# Patient Record
Sex: MALE | Race: BLACK OR AFRICAN AMERICAN | NOT HISPANIC OR LATINO | Employment: FULL TIME | URBAN - METROPOLITAN AREA
[De-identification: names, ages, dates, MRNs, and addresses within clinical notes are randomized per-mention and may not be internally consistent; named-entity substitution may affect disease eponyms.]

---

## 2022-06-22 ENCOUNTER — TRANSCRIBE ORDERS (OUTPATIENT)
Dept: PHYSICAL THERAPY | Facility: CLINIC | Age: 27
End: 2022-06-22

## 2022-06-22 DIAGNOSIS — G89.11 ACUTE PAIN OF LEFT SHOULDER DUE TO TRAUMA: ICD-10-CM

## 2022-06-22 DIAGNOSIS — M25.512 ACUTE PAIN OF LEFT SHOULDER DUE TO TRAUMA: ICD-10-CM

## 2022-06-22 DIAGNOSIS — S46.002A INJURY OF LEFT ROTATOR CUFF, INITIAL ENCOUNTER: Primary | ICD-10-CM

## 2022-06-24 ENCOUNTER — TREATMENT (OUTPATIENT)
Dept: PHYSICAL THERAPY | Facility: CLINIC | Age: 27
End: 2022-06-24

## 2022-06-24 DIAGNOSIS — M25.512 ACUTE PAIN OF LEFT SHOULDER: Primary | ICD-10-CM

## 2022-06-24 PROCEDURE — 97110 THERAPEUTIC EXERCISES: CPT | Performed by: PHYSICAL THERAPIST

## 2022-06-24 PROCEDURE — 97161 PT EVAL LOW COMPLEX 20 MIN: CPT | Performed by: PHYSICAL THERAPIST

## 2022-06-24 PROCEDURE — 97014 ELECTRIC STIMULATION THERAPY: CPT | Performed by: PHYSICAL THERAPIST

## 2022-06-24 PROCEDURE — 97140 MANUAL THERAPY 1/> REGIONS: CPT | Performed by: PHYSICAL THERAPIST

## 2022-06-24 NOTE — PATIENT INSTRUCTIONS
Access Code: [email or web address]  URL: [email or web address]  Date: 06/24/2022  Prepared by: Leila Miller    Exercises  Seated Shoulder Flexion Towel Slide at Table Top - 2-3 x daily - 15-30 reps  Standing 'L' Stretch at Counter - 2-3 x daily - 15-30 reps  Seated Shoulder Shrugs - 2-3 x daily - 15-30 reps  Seated Scapular Retraction - 2-3 x daily - 15-30 reps  Supine Shoulder External Rotation with Dowel - 2-3 x daily - 15-30 reps  Seated Shoulder External Rotation AAROM with Cane and Hand in Neutral - 2-3 x daily - 15-30 reps  Horizontal Shoulder Pendulum with Table Support - 2-3 x daily - 15-30 reps

## 2022-06-24 NOTE — PROGRESS NOTES
Physical Therapy Initial Evaluation and Plan of Care    Patient: Saul Aguirre   : 1985  Diagnosis/ICD-10 Code:  Acute pain of left shoulder [M25.512]  Referring practitioner: Gunnar Le Jr.*  Date of Initial Visit: 2022  Today's Date: 2022  Patient seen for 1 session         Visit Diagnoses:    ICD-10-CM ICD-9-CM   1. Acute pain of left shoulder  M25.512 719.41         Subjective Questionnaire: QuickDASH: 56.82% impairment      Subjective Evaluation    History of Present Illness  Date of onset: 2022  Mechanism of injury: Fell from a  about 8 feet and landed directly on his L shoulder with his arm against his side. Had XR (-) for fx. Waiting for MRI to be approved/scheduled. If arm relaxed pn is minimal. Has pn w/ most L shoulder mobility. Cannot lift past shoulder height. Pn increases the longer he is out of the sling. Pn wakes him a couple of times at night. Pn located top of shoulder, radiates into the back of the shoulder and down his bicep/tricep to his elbow. Denies pn beyond the elbow, N/T. Having inc popping/clicking w/ movement.     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   Precautions and Work Restrictions: off workQuality of life: fair    Pain  Current pain ratin  At best pain ratin  At worst pain rating: 10  Quality: radiating and sharp  Relieving factors: ice, rest, heat and medications  Aggravating factors: lifting, movement, overhead activity, sleeping, outstretched reach and repetitive movement  Progression: no change    Hand dominance: right    Diagnostic Tests  X-ray: normal    Treatments  Previous treatment: immobilization  Patient Goals  Patient goals for therapy: decreased pain, increased motion, increased strength, independence with ADLs/IADLs, return to sport/leisure activities and return to work               Objective          Observations   Left Shoulder   Negative for atrophy and deformity.      Additional Shoulder Observation Details  Presents w/ L arm in sling    Tenderness     Left Shoulder   Tenderness in the AC joint, acromion, biceps tendon (proximal), infraspinatus tendon, lateral scapula and supraspinatus tendon.     Cervical/Thoracic Screen   Cervical range of motion within normal limits    Neurological Testing     Sensation     Shoulder   Left Shoulder   Intact: light touch    Right Shoulder   Intact: light touch    Active Range of Motion   Left Shoulder   Flexion: 70 (pn superior and anterior shoulder) degrees   Abduction: 60 (pn superior and anterior shoulder) degrees   External rotation 0°: 68 degrees   Internal rotation BTB: Active internal rotation behind the back: R gluteal region.     Right Shoulder   Flexion: 155 degrees   Abduction: 165 degrees   External rotation 0°: 85 degrees   Internal rotation BTB: L1     Additional Active Range of Motion Details  Elbow/wrist mobility WNL      Passive Range of Motion   Left Shoulder   Flexion: 80 degrees   Abduction: 70 degrees   External rotation 45°: 40 degrees   External rotation 90°: 75 degrees   Internal rotation 90°: 45 degrees     Additional Passive Range of Motion Details  pn ant shoulder w/ all PROM    Strength/Myotome Testing     Left Shoulder     Planes of Motion   Flexion: 2-   Abduction: 2-   External rotation at 0°: 3-   Internal rotation at 0°: 3     Left Elbow   Flexion: 4+  Extension: 4+  Forearm supination: 4+  Forearm pronation: 5    Tests   Cervical     Left   Positive active compression ([place]).     Left Shoulder   Positive full can, Hawkin's, Neer's and Speed's.   Negative belly press, drop arm, external rotation lag sign, internal rotation lag sign and relocation.       See Exercise, Manual, and Modality Logs for complete treatment.       Assessment & Plan     Assessment  Impairments: abnormal or restricted ROM, activity intolerance, impaired physical strength, lacks appropriate home exercise program and pain with  function  Functional Limitations: carrying objects, lifting, sleeping, pulling, pushing, uncomfortable because of pain, reaching behind back, reaching overhead and unable to perform repetitive tasks  Assessment details: Pt is a 37 YOM who presents to PT w/ complaint of acute L shoulder pn after falling approx 8 ft from a piece of equipment on 6/20/22. Findings consistent w/ L RC/labral injury. He exhibits pronounced deficits in L shoulder A/PROM and strength in all planes. Very TTP along the posterior cuff, AC joint, ant GH joint, and proximal bicep tendon. Drop arm/lag signs (-), resisted elevation/rotation weak and painful. Speeds, labral tests reproduce ant GH pn and popping. (-) cervical involvement. Pt's pn and deficits limit his ability to perform daily/work activities. He would benefit from skilled PT services to address deficits and allow return to PLOF.    Barriers to therapy: n/a  Prognosis: good    Goals  Plan Goals: STG 3 wks  1) Pt to be compliant w/ initial HEP for ROM, strength and symptom mgmt.  2) Pt to report pn w/ ADL's to be no greater than 4/10.  3) Pt to improve shoulder AROM to 120 deg elevation, 75 deg ER  or better.  4) Pt to improve behind back IR ROM to L5 or higher.  5) Pt to improve QD score to 35% impairment or better to reflect improved pn and function.    LTG 6 wks  1) Pt to be independent w/ long term HEP and self mgmt.  2) Pt to tolerate 40 mins or more of work simulated activities w/ little to no pn or dysfunction.  3) Pt to demo restored shoulder mobility relative to contralateral side.  4) Pt to improve QD score to 15% impairment or better to reflect improved pn and function.   5) Pt to improve L shoulder strength to 4+/5 or greater in all planes.      Plan  Therapy options: will be seen for skilled therapy services  Planned modality interventions: TENS, cryotherapy, thermotherapy (hydrocollator packs), ultrasound and dry needling  Planned therapy interventions: flexibility,  functional ROM exercises, home exercise program, joint mobilization, manual therapy, neuromuscular re-education, postural training, soft tissue mobilization, strengthening, stretching, therapeutic activities, ADL retraining and body mechanics training  Frequency: 2x week  Duration in weeks: 12  Treatment plan discussed with: patient  Plan details: PT POC to emphasize restoration of pn free shoulder mobility, scapular and shoulder strength, dynamic stability, and appropriate body mechanics w/ work simulated activities utilizing TE/TA/NMR/MT, modalities as indicated for pn control        History # of Personal Factors and/or Comorbidities: LOW (0)  Examination of Body System(s): # of elements: LOW (1-2)  Clinical Presentation: EVOLVING  Clinical Decision Making: LOW       Timed:         Manual Therapy:    8     mins  94588;     Therapeutic Exercise:    15     mins  42300;     Neuromuscular Drew:    0    mins  51056;    Therapeutic Activity:     0     mins  11700;     Gait Trainin     mins  65828;     Ultrasound:     0     mins  43973;    Ionto                               0    mins   12539  Self Care                       0     mins   88757  Canalith Repos    0     mins 25691      Un-Timed:  Electrical Stimulation:    15     mins  27897 ( );  Dry Needling     0     mins self-pay  Traction     0     mins 62599  Low Eval     25     Mins  05219  Mod Eval     0     Mins  17422  High Eval                       0     Mins  30992        Timed Treatment:   23   mins   Total Treatment:     63   mins          PT: Leila Miller PT     [place] License: #[id number]    Electronically signed by Leila Miller PT, 22, 8:39 AM EDT    Certification Period: 2022 thru 2022  I certify that the therapy services are furnished while this patient is under my care.  The services outlined above are required by this patient, and will be reviewed every 90 days.         Physician  Signature:__________________________________________________    PHYSICIAN: Gunnar Le Jr., ANAHI      DATE:     Please sign and return via fax to [phone]. Thank you, [facility].

## 2022-06-27 ENCOUNTER — TREATMENT (OUTPATIENT)
Dept: PHYSICAL THERAPY | Facility: CLINIC | Age: 27
End: 2022-06-27

## 2022-06-27 NOTE — PROGRESS NOTES
Physical Therapy Daily Treatment Note      Patient: Saul Aguirre   : 1985  Referring practitioner: Gunnar Le Jr.*  Date of Initial Visit: Type: THERAPY  Noted: 2022  Today's Date: 2022  Patient seen for 2 sessions       Visit Diagnoses:  No diagnosis found.    Subjective   Saul Aguirre reports: he doesn't have a lot of pain at rest in the sling but pain comes on the longer he is out of the sling and with movement.    Pre tx pn score: 6  Post tx pn score: 4 after IFC and CP      Objective   See Exercise, Manual, and Modality Logs for complete treatment.       Assessment & Plan     Assessment    Assessment details: Pt with increased c/o pain with exercises and some discomfort with gentle manual therapy but able to tolerate.   Prognosis details: Progress GH and scapular ROM as tolerated to start getting more movement.          Timed:         Manual Therapy:    8     mins  12681;     Therapeutic Exercise:    25     mins  94123;     Neuromuscular Drew:        mins  55392;    Therapeutic Activity:          mins  35273;     Gait Training:           mins  78407;     Ultrasound:          mins  76398;    Ionto                                   mins   84727  Self Care                            mins   82202  Canalith Repos         mins 79829      Un-Timed:  Electrical Stimulation:    15     mins  36367 ( );  Dry Needling          mins self-pay  Traction          mins 46854      Timed Treatment:   48   mins   Total Treatment:     55   mins    DENEEN Araiza License: #[id number]

## 2022-06-29 ENCOUNTER — OFFICE VISIT (OUTPATIENT)
Dept: ORTHOPEDIC SURGERY | Facility: CLINIC | Age: 27
End: 2022-06-29

## 2022-06-29 VITALS
WEIGHT: 290 LBS | HEIGHT: 70 IN | BODY MASS INDEX: 41.52 KG/M2 | SYSTOLIC BLOOD PRESSURE: 125 MMHG | DIASTOLIC BLOOD PRESSURE: 85 MMHG

## 2022-06-29 DIAGNOSIS — Z02.6 ENCOUNTER RELATED TO WORKER'S COMPENSATION CLAIM: ICD-10-CM

## 2022-06-29 DIAGNOSIS — M25.512 LEFT SHOULDER PAIN, UNSPECIFIED CHRONICITY: Primary | ICD-10-CM

## 2022-06-29 PROCEDURE — 99204 OFFICE O/P NEW MOD 45 MIN: CPT | Performed by: ORTHOPAEDIC SURGERY

## 2022-06-29 NOTE — PROGRESS NOTES
"      [facility] Note    Subjective     CC: Pain of the Left Shoulder      HPI  Saul Aguirer is a 27 y.o. male who presents with new problem of: left shoulder pain.  Onset: mechanical fall. The issue has been ongoing for 9 day(s). Pain is a 7/10 on the pain scale. Pain is described as aching and burning. Associated symptoms include pain, popping, grinding and stiffness. The pain is worse with sleeping, working, lying on affected side and any movement of the joint; resting and ice improve the pain. Previous treatments have included: NSAIDS and physical therapy.    I have reviewed the following portions of the patient's history:History of Present Illness and review of systems.    He injured his left shoulder at work on June 20.  He felt a pop in his left shoulder.  Pain is 7 out of 10.  He works through construction.  He was referred to me for a rotator cuff tear.    Review of Systems   Constitutional: Negative.  Negative for chills, fatigue and fever.   HENT: Negative.  Negative for congestion and dental problem.    Eyes: Negative.  Negative for blurred vision.   Respiratory: Negative.  Negative for shortness of breath.    Cardiovascular: Negative.  Negative for leg swelling.   Gastrointestinal: Negative.  Negative for abdominal pain.   Endocrine: Negative.  Negative for polyuria.   Genitourinary: Negative.  Negative for difficulty urinating.   Musculoskeletal: Positive for arthralgias.   Skin: Negative.    Allergic/Immunologic: Negative.    Neurological: Negative.    Hematological: Negative.  Negative for adenopathy.   Psychiatric/Behavioral: Negative.  Negative for behavioral problems.       ROS:    Constiutional:Pt denies fever, chills, nausea, or vomiting.  MSK:as above      Objective      Past Medical History  Past Medical History:   Diagnosis Date   • No pertinent past medical history          Physical Exam  /85   Ht 177.8 cm (70\")   Wt 132 kg (290 lb)   BMI 41.61 kg/m²     Body " mass index is 41.61 kg/m².    Patient is well nourished and well developed.        Ortho Exam  Left shoulder with active flexion abduction to 90 degrees.  Passively 120 on the left.  Rotator cuff strength 4 out of 5.  Positive drop arm test.  Equivocal Wallace's test.  No specific tenderness.    Imaging/Labs/EMG Reviewed:  Imaging Results (Last 24 Hours)     Procedure Component Value Units Date/Time    XR Shoulder 2+ View Left [[id number]] Resulted: 06/29/22 1348     Updated: 06/29/22 1349    Narrative:      Left Shoulder X-Ray  Indication: Pain  AP, scapular Y, and axillary lateral views    Findings:  No fracture  No bony lesion  Normal soft tissues  Normal joint spaces    No prior studies were available for comparison.            Assessment:  1. Left shoulder pain, unspecified chronicity    2. Encounter related to worker's compensation claim        Plan:  Recommend over the counter anti-inflammatories for pain and/or swelling  I have ordered an MRI to evaluate for rotator cuff tear left shoulder.  I have ordered physical therapy to be done at [facility].  Continue work restrictions no use left arm    Follow Up:   Return for After MRI.      Medical Decision Making  Management Options : Low - OTC Drugs and OT or PT Therapy  and Moderate - 1 Undiagnosed New Problem with Uncertain Prognosis        Catarino Palomino M.D., [facility]  Orthopedic Surgeon  Fellowship Trained Sports Medicine  [facility]  Orthopedics and Sports Medicine  [address], [address]  [place] [address]    EMR Dragon/Transcription disclaimer:  Much of this encounter note is an electronic transcription of spoken language to printed text. Electronic transcription of spoken language may permit erroneous, or at times, nonsensical words or phrases to be inadvertently transcribed. Although I have reviewed the note for such errors, some may still exist.

## 2022-07-01 ENCOUNTER — TREATMENT (OUTPATIENT)
Dept: PHYSICAL THERAPY | Facility: CLINIC | Age: 27
End: 2022-07-01
Payer: OTHER MISCELLANEOUS

## 2022-07-01 DIAGNOSIS — M25.512 ACUTE PAIN OF LEFT SHOULDER: Primary | ICD-10-CM

## 2022-07-07 ENCOUNTER — TREATMENT (OUTPATIENT)
Dept: PHYSICAL THERAPY | Facility: CLINIC | Age: 27
End: 2022-07-07

## 2022-07-07 DIAGNOSIS — M25.512 ACUTE PAIN OF LEFT SHOULDER: Primary | ICD-10-CM

## 2022-07-07 PROCEDURE — 97110 THERAPEUTIC EXERCISES: CPT | Performed by: PHYSICAL THERAPIST

## 2022-07-07 PROCEDURE — 97014 ELECTRIC STIMULATION THERAPY: CPT | Performed by: PHYSICAL THERAPIST

## 2022-07-07 PROCEDURE — 97140 MANUAL THERAPY 1/> REGIONS: CPT | Performed by: PHYSICAL THERAPIST

## 2022-07-07 NOTE — PROGRESS NOTES
Physical Therapy Daily Treatment Note      Patient: Saul Aguirre   : 1995  Referring practitioner: Catarino Palomino MD  Date of Initial Visit: Type: THERAPY  Noted: 2022  Today's Date: 2022  Patient seen for 4 sessions       Visit Diagnoses:    ICD-10-CM ICD-9-CM   1. Acute pain of left shoulder  M25.512 719.41       Subjective   Saul Aguirre reports: Seems to be a little better. Pn not waking him as often at night. Saw Ortho last week, referred for MRI which he had performed yesterday. Was told to take disc to [facility]. Left it at home today. Still having pn w/ generally any movement of the L arm, but more so w/ reaching forward or overhead.    Pre tx pn score: 7  Post tx pn score: 0      Objective   See Exercise, Manual, and Modality Logs for complete treatment.     Active Range of Motion   Left Shoulder   Flexion: 115 (pn superior and anterior shoulder) degrees   Abduction: 90 (pn superior and anterior shoulder) degrees   External rotation 0°: 85 degrees   Internal rotation BTB: deferred      Assessment & Plan     Assessment    Assessment details: Active shoulder mobility improved in all planes (IR deferred 2/2 significant pn). Cont to complain of inc pn/popping w/ movement into any plane. Tolerance to PROM remains limited, notes slight relief w/ GHJ distraction. Pn unchanged after exercise, but diminished completely after ice/estim. No changes made to HEP. MRI completed but has not yet discussed results w/ MD. Pt needs continued PT to restore full strength, ROM, and function in order to allow return to full work and daily activities w/o pn or dysfunction.      Plan  Plan details: Cont w/ AAROM/PROM, light scapular strengthening as tolerated          Timed:         Manual Therapy:    8     mins  15123;     Therapeutic Exercise:    35     mins  81412;     Neuromuscular Drew:    0    mins  19771;    Therapeutic Activity:     0     mins  35753;     Gait Trainin     mins  02352;      Ultrasound:     0     mins  20655;    Ionto                               0    mins   59567  Self Care                       0     mins   13056  Canalith Repos    0     mins 40312      Un-Timed:  Electrical Stimulation:    15     mins  56859 ( );  Dry Needling     0     mins self-pay  Traction     0     mins 81681      Timed Treatment:   43   mins   Total Treatment:     58   mins    Leila Miller, PT  [place] License: #[id number]

## 2022-07-11 ENCOUNTER — TREATMENT (OUTPATIENT)
Dept: PHYSICAL THERAPY | Facility: CLINIC | Age: 27
End: 2022-07-11

## 2022-07-11 DIAGNOSIS — M25.512 ACUTE PAIN OF LEFT SHOULDER: Primary | ICD-10-CM

## 2022-07-11 DIAGNOSIS — M25.512 LEFT SHOULDER PAIN, UNSPECIFIED CHRONICITY: ICD-10-CM

## 2022-07-11 PROCEDURE — 97110 THERAPEUTIC EXERCISES: CPT | Performed by: PHYSICAL THERAPIST

## 2022-07-11 PROCEDURE — 97014 ELECTRIC STIMULATION THERAPY: CPT | Performed by: PHYSICAL THERAPIST

## 2022-07-11 PROCEDURE — 97140 MANUAL THERAPY 1/> REGIONS: CPT | Performed by: PHYSICAL THERAPIST

## 2022-07-11 NOTE — PROGRESS NOTES
Physical Therapy Daily Treatment Note      Patient: Saul Aguirre   : 1995  Referring practitioner: Catarino Palomino MD  Date of Initial Visit: Type: THERAPY  Noted: 2022  Today's Date: 2022  Patient seen for 5 sessions       Visit Diagnoses:    ICD-10-CM ICD-9-CM   1. Acute pain of left shoulder  M25.512 719.41       Subjective   Saul Aguirre reports: Shoulder seems to be feeling a little better. Still having some ant shoulder pn that radiates to the back of the arm w/ movement.    Pre tx pn score: 4  Post tx pn score: 0      Objective   See Exercise, Manual, and Modality Logs for complete treatment.     Active Range of Motion   Left Shoulder   Flexion: 125 deg w/ ant shoulder pn  Abduction: 90 deg w/ ant shoulder pn  External rotation 0°: 85 degrees   Internal rotation BTB: L1      Assessment & Plan     Assessment    Assessment details: Shoulder mobility continues to gradually improve. Pn decreased overall per pt report. Exercise tolerance remains somewhat low. Pt c/o localized ant shoulder pn w/ nearly all exercise, w/ occasional reports of pn radiating into back of upper arm. Unable to perform supine AAROM activities w/o PT assistance. PROM remains limited to 90 deg elevation 2/2 pn. Incorporated low grade GH joint mobilizations in attempt to reduce pn but only tolerated for short duration. No changes made to HEP. Pt needs continued PT to restore full strength, ROM, and function in order to allow return to full work and daily activities w/o pn or dysfunction.      Plan  Plan details: Cont w/ AAROM/AROM in gravity reduced positions as tolerated          Timed:         Manual Therapy:    10     mins  69998;     Therapeutic Exercise:    25     mins  18925;     Neuromuscular Drew:    0    mins  92614;    Therapeutic Activity:     0     mins  34912;     Gait Trainin     mins  23895;     Ultrasound:     0     mins  80740;    Ionto                               0    mins   45124  Self  Care                       0     mins   68663  Canalith Repos    0     mins 05022      Un-Timed:  Electrical Stimulation:    15     mins  28085 ( );  Dry Needling     0     mins self-pay  Traction     0     mins 73392      Timed Treatment:   35  mins   Total Treatment:     50   mins    Leila Miller, PT  [place] License: #[id number]

## 2022-07-19 ENCOUNTER — TREATMENT (OUTPATIENT)
Dept: PHYSICAL THERAPY | Facility: CLINIC | Age: 27
End: 2022-07-19

## 2022-07-19 DIAGNOSIS — M25.512 ACUTE PAIN OF LEFT SHOULDER: Primary | ICD-10-CM

## 2022-07-19 PROCEDURE — 97112 NEUROMUSCULAR REEDUCATION: CPT | Performed by: PHYSICAL THERAPIST

## 2022-07-19 PROCEDURE — 97110 THERAPEUTIC EXERCISES: CPT | Performed by: PHYSICAL THERAPIST

## 2022-07-19 PROCEDURE — 97530 THERAPEUTIC ACTIVITIES: CPT | Performed by: PHYSICAL THERAPIST

## 2022-07-19 NOTE — PROGRESS NOTES
Physical Therapy Daily Treatment Note      Patient: Saul Aguirre   : 1995  Referring practitioner: Catarino Palomino MD  Date of Initial Visit: Type: THERAPY  Noted: 2022  Today's Date: 2022  Patient seen for 6 sessions       Visit Diagnoses:    ICD-10-CM ICD-9-CM   1. Acute pain of left shoulder  M25.512 719.41       Subjective   Saul Aguirre reports: Pn intensity has remained mild but seems to be more constant over the past week or two, since removing his sling.    Pre tx pn score: 3  Post tx pn score: 2      Objective   See Exercise, Manual, and Modality Logs for complete treatment.     Active Range of Motion   Left Shoulder   Flexion: 140 deg w/ ant shoulder pn  Abduction: 100 deg w/ ant shoulder pn  External rotation 0°: 85 degrees   Internal rotation BTB: T10      Assessment & Plan     Assessment    Assessment details: Pt continues to show gradual gains in shoulder mobility. Pn intensity mild but constant. He demonstrated improved tolerance to exercise. Able to progress to antigravity shoulder mobility throughout partial ROM, limited more by fatigue than by pn today. Still has muscle guarding w/ attempted MT techniques limiting tolerance, so deferred. Pn reduced slightly at end of visit and pt declined need for ice/estim. MRI results scanned into chart by Ortho office, demonstrates capsulitis, AC joint arthropathy, and swelling of the superior labrum but no tears. Pt needs continued PT to restore full strength, ROM, and function in order to allow return to full work and daily activities w/o pn or dysfunction.      Plan  Plan details: Cont to progress AAROM/AROM as pt tolerates; resume wall slides          Timed:         Manual Therapy:    0     mins  04830;     Therapeutic Exercise:    25     mins  70832;     Neuromuscular Drew:    8    mins  82211;    Therapeutic Activity:     10     mins  05369;     Gait Trainin     mins  63971;     Ultrasound:     0     mins  83019;    Ionto                                0    mins   32929  Self Care                       0     mins   73984  Canalith Repos    0     mins 25475      Un-Timed:  Electrical Stimulation:    0     mins  51139 ( );  Dry Needling     0     mins self-pay  Traction     0     mins 94401      Timed Treatment:   43   mins   Total Treatment:     43   mins    Leila Miller, PT  [place] License: #[id number]

## 2022-08-04 ENCOUNTER — TREATMENT (OUTPATIENT)
Dept: PHYSICAL THERAPY | Facility: CLINIC | Age: 27
End: 2022-08-04

## 2022-08-04 DIAGNOSIS — M25.512 ACUTE PAIN OF LEFT SHOULDER: Primary | ICD-10-CM

## 2022-08-04 PROCEDURE — 97110 THERAPEUTIC EXERCISES: CPT | Performed by: PHYSICAL THERAPIST

## 2022-08-04 PROCEDURE — 97530 THERAPEUTIC ACTIVITIES: CPT | Performed by: PHYSICAL THERAPIST

## 2022-08-04 PROCEDURE — 97112 NEUROMUSCULAR REEDUCATION: CPT | Performed by: PHYSICAL THERAPIST

## 2022-08-04 NOTE — PROGRESS NOTES
Physical Therapy Reassessment  Patient: Saul Aguirre   : 1995  Diagnosis/ICD-10 Code:  Acute pain of left shoulder [M25.512]  Referring practitioner: Catarino Palomino MD  Date of Initial Visit: 2022  Today's Date: 2022  Patient seen for 7 sessions         Visit Diagnoses:    ICD-10-CM ICD-9-CM   1. Acute pain of left shoulder  M25.512 719.41       Subjective Questionnaire: QuickDASH: 29.55% impairment  Clinical Progress: improved  Home Program Compliance: Yes  Treatment has included: therapeutic exercise, neuromuscular re-education, manual therapy, therapeutic activity, electrical stimulation and cryotherapy      Subjective   Saul Aguirre reports: Pn improved. Now more intermittent, occurs primarily with movement, lifting, carrying, reaching but less intense than before, typically up to 2 or 3/10. Has discontinued use of sling completely.    Pre tx pn score: 1  Post tx pn score: 1      Objective          Tenderness     Left Shoulder   Tenderness in the AC joint, acromion, biceps tendon (proximal), infraspinatus tendon, lateral scapula and supraspinatus tendon.     Active Range of Motion   Left Shoulder   Flexion: 160 (pn superior shoulder at end range) degrees   Abduction: 140 (pn superior shoulder) degrees   External rotation 0°: 90 (anterior shoulder pn) degrees   Internal rotation BTB: L3     Right Shoulder   Flexion: 155 degrees   Abduction: 165 degrees   External rotation 0°: 85 degrees   Internal rotation BTB: L1     Additional Active Range of Motion Details        Passive Range of Motion   Left Shoulder   Flexion: 160 degrees   Abduction: 155 degrees   External rotation 45°: 80 degrees   External rotation 90°: 90 degrees   Internal rotation 90°: 55 degrees     Strength/Myotome Testing     Left Shoulder     Planes of Motion   Flexion: 4+   Abduction: 4   External rotation at 0°: 4   Internal rotation at 0°: 4     Left Elbow   Flexion: 4+  Extension: 4+  Forearm supination: 4+  Forearm  pronation: 5    Tests   Cervical     Left   Positive active compression ([place]re[place]).     Left Shoulder   Positive full can (mild pn, min weakness), Hawkin's, Neer's and Speed's.   Negative belly press, drop arm, external rotation lag sign, internal rotation lag sign and relocation.       See Exercise, Manual, and Modality Logs for complete treatment.       Assessment & Plan     Assessment    Assessment details: Reassessment performed. Pt has completed 7 PT visits. He has made significant gains in shoulder strength, mobility, pn, and function. Active flxn, ER restored-abd remains mildly limited. All motions associated w/ popping, superior shoulder pn at end range. Passive mobility nearly restored. He remains TTP throughout the ant/post shoulder. Strength 4/5 or greater in all planes. He is making good progress toward PT goals but continues to have pn/difficulty w/ reaching, lifting, carrying, etc. Pt needs continued PT to restore full strength, ROM, and function in order to allow return to full work and daily activities w/o pn or dysfunction.      Goals  Plan Goals: STG 3 wks  1) Pt to be compliant w/ initial HEP for ROM, strength and symptom mgmt.-MET  2) Pt to report pn w/ ADL's to be no greater than 4/10.-MET  3) Pt to improve shoulder AROM to 120 deg elevation, 75 deg ER  or better.-MET  4) Pt to improve behind back IR ROM to L5 or higher.-MET  5) Pt to improve QD score to 35% impairment or better to reflect improved pn and function.-MET    LTG 6 wks  1) Pt to be independent w/ long term HEP and self mgmt.-PROGRESSING  2) Pt to tolerate 40 mins or more of work simulated activities w/ little to no pn or dysfunction.-PROGRESSING  3) Pt to demo restored shoulder mobility relative to contralateral side.-PARTIALLY MET  4) Pt to improve QD score to 15% impairment or better to reflect improved pn and function. -PROGRESSING  5) Pt to improve L shoulder strength to 4+/5 or greater in all planes.-PROGRESSING    Plan  Plan  details: Cont w/ focus on regaining full shoulder mobility, improving strength, progressing to work simulated activities as tolerated        Progress toward previous goals: Partially Met        Timed:         Manual Therapy:    0     mins  58523;     Therapeutic Exercise:    30     mins  12177;     Neuromuscular Drew:    8    mins  30052;    Therapeutic Activity:     10     mins  81859;     Gait Trainin     mins  18576;     Ultrasound:     0     mins  91715;    Ionto                               0    mins   35530  Self Care                       0     mins   16535  Canalith Repos    0     mins 70336      Un-Timed:  Electrical Stimulation:    0     mins  55785 ( );  Dry Needling     0     mins self-pay  Traction     0     mins 47306  Re-Eval                           0    mins  35651      Timed Treatment:   48   mins   Total Treatment:     48   mins          PT: Leila Miller PT     [place] License: #[id number]    Electronically signed by Leila Miller PT, 22, 11:12 AM EDT

## 2022-08-04 NOTE — PATIENT INSTRUCTIONS
Access Code: [email or web address]  URL: [email or web address]  Date: 08/04/2022  Prepared by: Leila Miller    Exercises  Sidelying Shoulder Abduction Palm Forward - 1-2 x daily - 15-30 reps  Sidelying Shoulder ER with Towel and Dumbbell - 1-2 x daily - 15-30 reps  Supine Shoulder Flexion Extension AAROM with Dowel - 1-2 x daily - 15-30 reps  Standing Single Shoulder Flexion Wall Slide with Palm Up - 1-2 x daily - 15-30 reps  Standing Shoulder Abduction Slides at Wall - 1-2 x daily - 15-30 reps

## 2022-08-08 ENCOUNTER — TREATMENT (OUTPATIENT)
Dept: PHYSICAL THERAPY | Facility: CLINIC | Age: 27
End: 2022-08-08

## 2022-08-08 DIAGNOSIS — M25.512 ACUTE PAIN OF LEFT SHOULDER: Primary | ICD-10-CM

## 2022-08-08 PROCEDURE — 97110 THERAPEUTIC EXERCISES: CPT | Performed by: PHYSICAL THERAPIST

## 2022-08-08 PROCEDURE — 97112 NEUROMUSCULAR REEDUCATION: CPT | Performed by: PHYSICAL THERAPIST

## 2022-08-08 PROCEDURE — 97530 THERAPEUTIC ACTIVITIES: CPT | Performed by: PHYSICAL THERAPIST

## 2022-08-08 NOTE — PROGRESS NOTES
Physical Therapy Daily Treatment Note      Patient: Saul Aguirre   : 1995  Referring practitioner: Catarino Palomino MD  Date of Initial Visit: Type: THERAPY  Noted: 2022  Today's Date: 2022  Patient seen for 8 sessions       Visit Diagnoses:    ICD-10-CM ICD-9-CM   1. Acute pain of left shoulder  M25.512 719.41       Subjective   Saul Aguirre reports: Feeling sore today. No issues after last visit. Pn located primarily on top of shoulder today.    Pre tx pn score: 4  Post tx pn score: 4      Objective   See Exercise, Manual, and Modality Logs for complete treatment.     Active Range of Motion   Left Shoulder   Flexion: 160 (pn superior shoulder at end range) degrees   Abduction: 148 (pn superior shoulder) degrees   External rotation 0°: 90 (anterior shoulder pn) degrees   Internal rotation BTB: L1      Assessment & Plan     Assessment    Assessment details: Pt cont to make gradual gains in shoulder mobility. Has minimal complaints w/ progression to resistance exercise/anti gravity movements. Able to add weight to sidelying abd/ER. Fatigues quickly but tolerates fairly well w/ rest breaks. Pt needs continued PT to restore full strength, ROM, and function in order to allow return to full work and daily activities w/o pn or dysfunction.      Plan  Plan details: Cont w/ strengthening/stretching          Timed:         Manual Therapy:    0     mins  60663;     Therapeutic Exercise:    24     mins  96947;     Neuromuscular Drew:    8    mins  13315;    Therapeutic Activity:     9     mins  95617;     Gait Trainin     mins  06493;     Ultrasound:     0     mins  50042;    Ionto                               0    mins   10832  Self Care                       0     mins   66166  Canalith Repos    0     mins 55722      Un-Timed:  Electrical Stimulation:    0     mins  93172 ( );  Dry Needling     0     mins self-pay  Traction     0     mins 71211      Timed Treatment:   41   mins   Total  Treatment:     41   mins    Leila Miller, PT  [place] License: #[id number]

## 2022-08-11 ENCOUNTER — TREATMENT (OUTPATIENT)
Dept: PHYSICAL THERAPY | Facility: CLINIC | Age: 27
End: 2022-08-11

## 2022-08-11 DIAGNOSIS — M25.512 ACUTE PAIN OF LEFT SHOULDER: Primary | ICD-10-CM

## 2022-08-11 PROCEDURE — 97110 THERAPEUTIC EXERCISES: CPT | Performed by: PHYSICAL THERAPIST

## 2022-08-11 PROCEDURE — 97530 THERAPEUTIC ACTIVITIES: CPT | Performed by: PHYSICAL THERAPIST

## 2022-08-11 PROCEDURE — 97140 MANUAL THERAPY 1/> REGIONS: CPT | Performed by: PHYSICAL THERAPIST

## 2022-08-11 NOTE — PROGRESS NOTES
Physical Therapy Daily Treatment Note      Patient: Saul Aguirre   : 1995  Referring practitioner: Catarino Palomino MD  Date of Initial Visit: Type: THERAPY  Noted: 2022  Today's Date: 2022  Patient seen for 9 sessions       Visit Diagnoses:    ICD-10-CM ICD-9-CM   1. Acute pain of left shoulder  M25.512 719.41       Subjective   Saul Aguirre reports: Feeling pretty good. Not as sore today.    Pre tx pn score: 3-top of shoulder  Post tx pn score: 0      Objective   See Exercise, Manual, and Modality Logs for complete treatment.       Assessment & Plan     Assessment    Assessment details: Pt remains limited w/ active abduction 2/2 pn, while rotation and forward elevation intact and mostly asymptomatic. Tolerance to MT techniques still fairly low-reports soreness throughout the GHJ region w/ even low grade mobs. Some relief w/ distraction. Passive abd improved w/ stretching. Issued RTB and discussed working on bilateral/unilateral rows, sidelying abd/ER using light weight as tolerated, isometrics. He verbalized understanding.    Plan  Plan details: Continue w/ scapular/cuff strengthening, stabilization          Timed:         Manual Therapy:    10     mins  38537;     Therapeutic Exercise:    24     mins  43498;     Neuromuscular Drew:    0    mins  28862;    Therapeutic Activity:     10     mins  12224;     Gait Trainin     mins  64013;     Ultrasound:     0     mins  94266;    Ionto                               0    mins   02469  Self Care                       0     mins   22088  Canalith Repos    0     mins 97055      Un-Timed:  Electrical Stimulation:    15     mins  55354 ( );  Dry Needling     0     mins self-pay  Traction     0     mins 77783      Timed Treatment:   44   mins   Total Treatment:     59   mins    Leila Miller, PT  [place] License: #[id number]

## 2022-08-16 ENCOUNTER — TREATMENT (OUTPATIENT)
Dept: PHYSICAL THERAPY | Facility: CLINIC | Age: 27
End: 2022-08-16

## 2022-08-16 DIAGNOSIS — M25.512 ACUTE PAIN OF LEFT SHOULDER: Primary | ICD-10-CM

## 2022-08-16 PROCEDURE — 97530 THERAPEUTIC ACTIVITIES: CPT | Performed by: PHYSICAL THERAPIST

## 2022-08-16 PROCEDURE — 97112 NEUROMUSCULAR REEDUCATION: CPT | Performed by: PHYSICAL THERAPIST

## 2022-08-16 PROCEDURE — 97110 THERAPEUTIC EXERCISES: CPT | Performed by: PHYSICAL THERAPIST

## 2022-08-16 NOTE — PROGRESS NOTES
Physical Therapy Daily Treatment Note      Patient: Salu Aguirre   : 1995  Referring practitioner: Catarino Palomino MD  Date of Initial Visit: Type: THERAPY  Noted: 2022  Today's Date: 2022  Patient seen for 10 sessions       Visit Diagnoses:    ICD-10-CM ICD-9-CM   1. Acute pain of left shoulder  M25.512 719.41       Subjective   Saul Aguirre reports: Had some swelling in the front of his shoulder over the weekend and was a little more sore. Doesn't recall any changes in activity. Home exercises going ok. Pn minimal today. Overall pn fairly low. Still experiencing some popping in his shoulder with reaching.    Pre tx pn score: 2.5  Post tx pn score: 0      Objective   See Exercise, Manual, and Modality Logs for complete treatment.       Assessment & Plan     Assessment    Assessment details: Pn mild most of the time. Showing gradual gains in shoulder mobility. Remains mildly limited w/ shoulder abduction, reporting inc anterior shoulder pn and popping/clicking throughout motion. Continued w/ strengthening progressions as outlined in exercise flow sheet. He tolerated most well, primarily complaining of fatigue and mild shoulder soreness w/ resisted abd/ER. Good pn relief w/ ice/estim. Pt needs continued PT to restore full strength, ROM, and function in order to allow return to full work and daily activities w/o pn or dysfunction.      Plan  Plan details: Cont to progress rotator cuff/scapular strengthening as tolerated; progress to work simulated activities as able (shoveling, pushing, pulling)          Timed:         Manual Therapy:    0     mins  09588;     Therapeutic Exercise:    25     mins  32109;     Neuromuscular Drew:    8    mins  61213;    Therapeutic Activity:     9     mins  58878;     Gait Trainin     mins  66179;     Ultrasound:     0     mins  44711;    Ionto                               0    mins   88295  Self Care                       0     mins   44864  Aide  Repos    0     mins 59755      Un-Timed:  Electrical Stimulation:    15     mins  65372 ( );  Dry Needling     0     mins self-pay  Traction     0     mins 43324      Timed Treatment:   42   mins   Total Treatment:     57   mins    Leila Miller, PT  [place] License: #[id number]

## 2022-08-22 ENCOUNTER — TREATMENT (OUTPATIENT)
Dept: PHYSICAL THERAPY | Facility: CLINIC | Age: 27
End: 2022-08-22

## 2022-08-22 DIAGNOSIS — M25.512 ACUTE PAIN OF LEFT SHOULDER: Primary | ICD-10-CM

## 2022-08-22 PROCEDURE — 97112 NEUROMUSCULAR REEDUCATION: CPT | Performed by: PHYSICAL THERAPIST

## 2022-08-22 PROCEDURE — 97530 THERAPEUTIC ACTIVITIES: CPT | Performed by: PHYSICAL THERAPIST

## 2022-08-22 PROCEDURE — 97110 THERAPEUTIC EXERCISES: CPT | Performed by: PHYSICAL THERAPIST

## 2022-08-22 NOTE — PROGRESS NOTES
"Physical Therapy Daily Treatment Note      Patient: Saul Aguirre   : 1995  Referring practitioner: Catarino Palomino MD  Date of Initial Visit: Type: THERAPY  Noted: 2022  Today's Date: 2022  Patient seen for 11 sessions       Visit Diagnoses:    ICD-10-CM ICD-9-CM   1. Acute pain of left shoulder  M25.512 719.41       Subjective   Saul Aguirre reports: \"I cant' tell if I'm getting better or getting worse. Sometimes the pain comes out of nowhere, I can just be sitting. I don't usually have pn after PT.\"    Pre tx pn score: 4.5  Post tx pn score: 0      Objective   See Exercise, Manual, and Modality Logs for complete treatment.           Assessment & Plan     Assessment    Assessment details: Appears to be making progress in terms of strength and ROM, but denies significant change in pn over past couple of weeks. C/o popping in shoulder w/ generally all mobility. Able to tolerate progression to GTB IR/ER, wall plank activities, and resisted pushing/pulling w/ TB w/o pn. However reported inc discomfort w/ activities involving OKC shoulder elevation or CKC activity w/ long lever arm. Discussed returning to Ortho if he does not feel as though pn is improving. Provided contact info. However would likely benefit from continued PT given progress w/ strength and mobility.    Plan  Plan details: Work simulated strengthening-box lifts, pushing/pulling          Timed:         Manual Therapy:    0     mins  14859;     Therapeutic Exercise:    10     mins  50723;     Neuromuscular Drew:    10    mins  05009;    Therapeutic Activity:     15     mins  60539;     Gait Trainin     mins  39860;     Ultrasound:     0     mins  05037;    Ionto                               0    mins   13095  Self Care                       0     mins   18992  Canalith Repos    0     mins 69624      Un-Timed:  Electrical Stimulation:    0     mins  44635 ( );  Dry Needling     0     mins self-pay  Traction     0    "  mins 57179      Timed Treatment:   35   mins   Total Treatment:     35   mins    Leila Miller, PT  [place] License: #[id number]

## 2022-08-25 ENCOUNTER — TREATMENT (OUTPATIENT)
Dept: PHYSICAL THERAPY | Facility: CLINIC | Age: 27
End: 2022-08-25

## 2022-08-25 DIAGNOSIS — M25.512 ACUTE PAIN OF LEFT SHOULDER: Primary | ICD-10-CM

## 2022-08-25 PROCEDURE — 97112 NEUROMUSCULAR REEDUCATION: CPT | Performed by: PHYSICAL THERAPIST

## 2022-08-25 PROCEDURE — 97530 THERAPEUTIC ACTIVITIES: CPT | Performed by: PHYSICAL THERAPIST

## 2022-08-25 PROCEDURE — 97140 MANUAL THERAPY 1/> REGIONS: CPT | Performed by: PHYSICAL THERAPIST

## 2022-08-25 PROCEDURE — 97110 THERAPEUTIC EXERCISES: CPT | Performed by: PHYSICAL THERAPIST

## 2022-08-25 NOTE — PROGRESS NOTES
Physical Therapy Daily Treatment Note      Patient: Saul Aguirre   : 1995  Referring practitioner: Catarino Palomino MD  Date of Initial Visit: Type: THERAPY  Noted: 2022  Today's Date: 2022  Patient seen for 12 sessions       Visit Diagnoses:    ICD-10-CM ICD-9-CM   1. Acute pain of left shoulder  M25.512 719.41       Subjective   Saul Aguirre reports: Pn unchanged. Reports generalized soreness ant/post shoulder. Still considering reaching out to Ortho to discuss ongoing pn.    Pre tx pn score: 2-3  Post tx pn score: 5/10 after there ex/MT; 0/10 after ice/stim      Objective   See Exercise, Manual, and Modality Logs for complete treatment.       Assessment & Plan     Assessment    Assessment details: Pt denies change in pn level. Continues to report constant soreness in shoulder, post>ant today. Continued w/ low grade joint mobilization and passive shoulder stretching followed by progression of scapular stabilization/cuff strengthening as outlined in chart. Avoided OKC long lever activities 2/2 report of inc pn. Transitioned to light work simulated activity including shoveling motion w/ TRX bar. He tolerated most fairly well, noted inc soreness in post shoulder at end of visit. Improved w/ ice/stim. Pt needs continued PT to restore full strength, ROM, and function in order to allow return to full work and daily activities w/o pn or dysfunction.      Plan  Plan details: Cont w/ functional strengthening, manual therapy          Timed:         Manual Therapy:    8     mins  19960;     Therapeutic Exercise:    10     mins  52029;     Neuromuscular Drew:    10    mins  78305;    Therapeutic Activity:     15     mins  28013;     Gait Trainin     mins  24874;     Ultrasound:     0     mins  67562;    Ionto                               0    mins   77085  Self Care                       0     mins   31096  Canalith Repos    0     mins 12034      Un-Timed:  Electrical Stimulation:    15      mins  07839 ( );  Dry Needling     0     mins self-pay  Traction     0     mins 96732      Timed Treatment:   43   mins   Total Treatment:     58   mins    Leila Miller, PT  [place] License: #[id number]

## 2022-08-29 ENCOUNTER — TREATMENT (OUTPATIENT)
Dept: PHYSICAL THERAPY | Facility: CLINIC | Age: 27
End: 2022-08-29

## 2022-08-29 DIAGNOSIS — M25.512 ACUTE PAIN OF LEFT SHOULDER: Primary | ICD-10-CM

## 2022-08-29 PROCEDURE — 97530 THERAPEUTIC ACTIVITIES: CPT | Performed by: PHYSICAL THERAPIST

## 2022-08-29 PROCEDURE — 97112 NEUROMUSCULAR REEDUCATION: CPT | Performed by: PHYSICAL THERAPIST

## 2022-08-29 PROCEDURE — 97110 THERAPEUTIC EXERCISES: CPT | Performed by: PHYSICAL THERAPIST

## 2022-08-29 NOTE — PROGRESS NOTES
Physical Therapy Daily Treatment Note      Patient: Saul Aguirre   : 1995  Referring practitioner: Catarino Palomino MD  Date of Initial Visit: Type: THERAPY  Noted: 2022  Today's Date: 2022  Patient seen for 13 sessions       Visit Diagnoses:    ICD-10-CM ICD-9-CM   1. Acute pain of left shoulder  M25.512 719.41       Subjective   Saul Aguirre reports: Shoulder seems to be a little improved, a little less sore. Unsure if he would be able to tolerate all of his work activities, primarily repetitively shoveling to lay/spread asphalt.    Pre tx pn score: 2  Post tx pn score: 2      Objective   See Exercise, Manual, and Modality Logs for complete treatment.       Assessment & Plan     Assessment    Assessment details: Continued w/ general shoulder strengthening and endurance as well as work simulated activity including shoveling motion w/ TRX rip bar, weighted sled push/pull up to 40 lbs of force, and two handed KB lifts to shelf at chest height. Progressed to shoulder TB series for general strength/conditioning. Pt tolerated most very well. Reported less pn w/ shoveling motion today compared to previous visit.  Encouraged setting up appt w/ Ortho to discuss readiness to return to normal work duties.    Plan  Plan details: Cont to progress work simulated activities as tolerated          Timed:         Manual Therapy:    0     mins  08971;     Therapeutic Exercise:    10     mins  65539;     Neuromuscular Drew:    8    mins  69817;    Therapeutic Activity:     25     mins  71474;     Gait Trainin     mins  06127;     Ultrasound:     0     mins  52650;    Ionto                               0    mins   49871  Self Care                       0     mins   88607  Canalith Repos    0     mins 17912      Un-Timed:  Electrical Stimulation:    15     mins  77844 ( );  Dry Needling     0     mins self-pay  Traction     0     mins 76459      Timed Treatment:   43   mins   Total Treatment:      58   mins    Leila Miller, PT  [place] License: #[id number]

## 2022-09-01 ENCOUNTER — TREATMENT (OUTPATIENT)
Dept: PHYSICAL THERAPY | Facility: CLINIC | Age: 27
End: 2022-09-01

## 2022-09-01 DIAGNOSIS — M25.512 ACUTE PAIN OF LEFT SHOULDER: Primary | ICD-10-CM

## 2022-09-01 PROCEDURE — 97110 THERAPEUTIC EXERCISES: CPT | Performed by: PHYSICAL THERAPIST

## 2022-09-01 PROCEDURE — 97530 THERAPEUTIC ACTIVITIES: CPT | Performed by: PHYSICAL THERAPIST

## 2022-09-01 NOTE — PROGRESS NOTES
Physical Therapy Reassessment  Patient: Saul Aguirre   : 1995  Diagnosis/ICD-10 Code:  Acute pain of left shoulder [M25.512]  Referring practitioner: Catarino Palomino MD  Date of Initial Visit: 2022  Today's Date: 2022  Patient seen for 14 sessions         Visit Diagnoses:    ICD-10-CM ICD-9-CM   1. Acute pain of left shoulder  M25.512 719.41       Subjective Questionnaire: QuickDASH: 25% impairment  Clinical Progress: improved  Home Program Compliance: Yes  Treatment has included: therapeutic exercise, neuromuscular re-education, manual therapy and therapeutic activity      Subjective   Saul Aguirre reports: Pn remains minimal, no worse than 2-3/10 w/  strenuous activity. No pn w/ routine daily activities or w/ his exercises. No sleep disturbance. Feels that he could tolerate his work activities okay, unsure how he would do over a 10-12 hour shift.    Pre tx pn score: 2  Post tx pn score: 2      Objective          Tenderness     Left Shoulder   Tenderness in the AC joint, acromion, infraspinatus tendon and supraspinatus tendon. No tenderness in the biceps tendon (proximal).     Active Range of Motion   Left Shoulder   Flexion: 165 (no pn, reports popping) degrees   Abduction: 160 (mild pn superior shoulder at end range) degrees   External rotation 0°: 93 degrees   Internal rotation BTB: T10     Right Shoulder   Flexion: 155 degrees   Abduction: 165 degrees   External rotation 0°: 85 degrees   Internal rotation BTB: L1     Passive Range of Motion   Left Shoulder   Normal passive range of motion    Strength/Myotome Testing     Left Shoulder     Planes of Motion   Flexion: 5   Abduction: 4+   External rotation at 0°: 4+   Internal rotation at 0°: 5     Left Elbow   Flexion: 4+  Extension: 4+  Forearm supination: 4+  Forearm pronation: 5    Additional Strength Details  Mild sup/post shoulder pn w/ resisted abd    Tests   Cervical     Left   Negative active compression ([place]).     Left Shoulder    Positive full can (mild pn, min weakness), Hawkin's (min pn) and Neer's (min pn).   Negative belly press, drop arm, external rotation lag sign, internal rotation lag sign, Speed's and relocation.       See Exercise, Manual, and Modality Logs for complete treatment.       Assessment & Plan     Assessment    Assessment details: Reassessment performed. Pt has completed 14 PT visits. He exhibits restored active/passive shoulder mobility in all planes. Strength grossly 4+ to 5 w/ only mild superior shoulder discomfort reported w/ resisted abduction. He has little to no pn at rest, elevates to 2-3/10 w/ heavy or repetitive activity. Today he completed approx 45 minutes of shoulder strengthening and work simulated activity consisting of repetitive 20lb box lifts, simulated shoveling with TB and TRX bar-all w/o inc in pn. He has one authorized PT visit remaining, verbalizes feeling ready to RTW. Scheduled for f/u w/ Ortho next week.    Goals  Plan Goals: STG 3 wks  1) Pt to be compliant w/ initial HEP for ROM, strength and symptom mgmt.-MET  2) Pt to report pn w/ ADL's to be no greater than 4/10.-MET  3) Pt to improve shoulder AROM to 120 deg elevation, 75 deg ER  or better.-MET  4) Pt to improve behind back IR ROM to L5 or higher.-MET  5) Pt to improve QD score to 35% impairment or better to reflect improved pn and function.-MET    LTG 6 wks  1) Pt to be independent w/ long term HEP and self mgmt.-PROGRESSING  2) Pt to tolerate 40 mins or more of work simulated activities w/ little to no pn or dysfunction.-MET  3) Pt to demo restored shoulder mobility relative to contralateral side.-PARTIALLY MET  4) Pt to improve QD score to 15% impairment or better to reflect improved pn and function. -PROGRESSING  5) Pt to improve L shoulder strength to 4+/5 or greater in all planes.-MET      Plan  Plan details: Plan transition to independent mgmt unless otherwise recommended by MD        Progress toward previous goals: Partially  Met        Timed:         Manual Therapy:    0     mins  87476;     Therapeutic Exercise:    15     mins  67754;     Neuromuscular Drew:    0    mins  27509;    Therapeutic Activity:     30    mins  94591;     Gait Trainin     mins  78848;     Ultrasound:     0     mins  90324;    Ionto                               0    mins   83125  Self Care                       0     mins   58162  Canalith Repos    0     mins 09529      Un-Timed:  Electrical Stimulation:    0     mins  71325 ( );  Dry Needling     0     mins self-pay  Traction     0     mins 37327  Re-Eval                           0    mins  63834      Timed Treatment:   45   mins   Total Treatment:     45   mins          PT: Leila Miller, PT     [place] License: #[id number]    Electronically signed by Leila Miller, PT, 22, 12:59 PM EDT

## 2022-09-06 ENCOUNTER — OFFICE VISIT (OUTPATIENT)
Dept: ORTHOPEDIC SURGERY | Facility: CLINIC | Age: 27
End: 2022-09-06

## 2022-09-06 VITALS
BODY MASS INDEX: 41.52 KG/M2 | SYSTOLIC BLOOD PRESSURE: 130 MMHG | WEIGHT: 290 LBS | DIASTOLIC BLOOD PRESSURE: 86 MMHG | HEIGHT: 70 IN

## 2022-09-06 DIAGNOSIS — M75.52 BURSITIS OF LEFT SHOULDER: ICD-10-CM

## 2022-09-06 DIAGNOSIS — Z02.6 ENCOUNTER RELATED TO WORKER'S COMPENSATION CLAIM: Primary | ICD-10-CM

## 2022-09-06 DIAGNOSIS — M75.42 IMPINGEMENT SYNDROME OF LEFT SHOULDER: ICD-10-CM

## 2022-09-06 DIAGNOSIS — M25.512 LEFT SHOULDER PAIN, UNSPECIFIED CHRONICITY: ICD-10-CM

## 2022-09-06 PROCEDURE — 99213 OFFICE O/P EST LOW 20 MIN: CPT | Performed by: ORTHOPAEDIC SURGERY

## 2022-09-06 NOTE — PROGRESS NOTES
[facility] Up       Office Follow Up Visit       Patient Name: Saul Aguirre    Chief Complaint:   Chief Complaint   Patient presents with   • Follow-up     9 week f/u Left shoulder pain (MRI 7/6/2022)            Referring Physician: No ref. provider found    History of Present Illness:   It has been 9  week(s) since Saul Aguirre's last visit. Saul Aguirre returns to clinic today for F/U: follow-up of leftBody Part: shoulderReason: pain. The issue has been ongoing for 2 month(s). Saul Aguirre rates HIS/HER: hispain at 2/10 on the pain scale. Previous/current treatments: physical therapy. Current symptoms:Symptoms: pain and popping. The pain is worse with any movement of the joint; resting, ice and pain medication and/or NSAID improves the pain. Overall, he/she: heis doing better.  I have reviewed the patient's history of present illness as noted/entered above.    I have reviewed the patient's past medical history, surgical history, social history, family history, medications, and allergies as noted in the electronic medical record and as noted/entered.  I have reviewed the patient's review of systems as noted/enter and updated as noted in the patient's HPI.    Left shoulder Worker's Compensation injury patient has been treated by Dr. Catarino Palomino.  PT Leila Alfred    Fall from 8 feet  PT helping  He remains a light duty.  Not quite ready for 10 to 12-hour full duty days at this point I do think he and if it from physical therapy with additional strengthening in anticipation of a full duty return at next visit.    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  Negative for chills, fatigue and fever.   HENT: Negative.  Negative for congestion and dental problem.    Eyes: Negative.  Negative for blurred vision.   Respiratory:  "Negative.  Negative for shortness of breath.    Cardiovascular: Negative.  Negative for leg swelling.   Gastrointestinal: Negative.  Negative for abdominal pain.   Endocrine: Negative.  Negative for polyuria.   Genitourinary: Negative.  Negative for difficulty urinating.   Musculoskeletal: Positive for arthralgias.   Skin: Negative.    Allergic/Immunologic: Negative.    Neurological: Negative.    Hematological: Negative.  Negative for adenopathy.   Psychiatric/Behavioral: Negative.  Negative for behavioral problems.        Past Medical History:   Past Medical History:   Diagnosis Date   • No pertinent past medical history        Past Surgical History:   Past Surgical History:   Procedure Laterality Date   • HERNIA REPAIR     • HERNIA REPAIR         Family History:   Family History   Problem Relation Age of Onset   • Diabetes Mother    • Hypertension Mother    • Hypertension Father        Social History:   Social History     Socioeconomic History   • Marital status: Unknown   Tobacco Use   • Smoking status: Never Smoker   • Smokeless tobacco: Never Used   Substance and Sexual Activity   • Alcohol use: Not Currently   • Drug use: Never   • Sexual activity: Defer       Medications: No current outpatient medications on file.    Allergies: No Known Allergies    The following portions of the patient's history were reviewed and updated as appropriate: allergies, current medications, past family history, past medical history, past social history, past surgical history and problem list.        Objective    Objective      Vital Signs:   Vitals:    09/06/22 1345   BP: 130/86   Weight: 132 kg (290 lb)   Height: 177.8 cm (70\")       Ortho Exam:  Left shoulder painful arc of motion more than 120 degrees satisfactory active and passive range of motion still describes some popping with impingement testing positive Neer positive Jose.  Negative DLS negative AC joint good rotator cuff strength    Results Review:  Imaging Results " (Last 24 Hours)     ** No results found for the last 24 hours. **        I personally reviewed and interpreted his MRI from [facility] completed on 7/6/2022 AC joint arthritic type changes noted no full-thickness rotator cuff tear noted.  Procedures            Assessment / Plan      Assessment/Plan:   Problem List Items Addressed This Visit        Health Encounters    Encounter related to worker's compensation claim - Primary       Musculoskeletal and Injuries    Left shoulder pain    Impingement syndrome of left shoulder    Bursitis of left shoulder          Work-related injury left shoulder    Remain at light duty no overhead lifting no over chest height lifting.  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    PT Rx provided    MMI to be determined.  Anticipate full duty release at next visit.    Follow Up: 6 weeks      Michael Wall MD, FAAOS  Orthopedic Surgeon  Fellowship Trained Shoulder and Elbow Surgeon  [facility]  Orthopedics and Sports Medicine  [address] [place] [address], [address]  [place] [address]    09/06/22  14:16 EDT

## 2022-09-08 ENCOUNTER — TREATMENT (OUTPATIENT)
Dept: PHYSICAL THERAPY | Facility: CLINIC | Age: 27
End: 2022-09-08

## 2022-09-08 DIAGNOSIS — M25.512 ACUTE PAIN OF LEFT SHOULDER: Primary | ICD-10-CM

## 2022-09-08 PROCEDURE — 97112 NEUROMUSCULAR REEDUCATION: CPT | Performed by: PHYSICAL THERAPIST

## 2022-09-08 PROCEDURE — 97110 THERAPEUTIC EXERCISES: CPT | Performed by: PHYSICAL THERAPIST

## 2022-09-08 PROCEDURE — 97530 THERAPEUTIC ACTIVITIES: CPT | Performed by: PHYSICAL THERAPIST

## 2022-09-08 NOTE — PROGRESS NOTES
"Physical Therapy Daily Treatment Note      Patient: Saul Aguirre   : 1995  Referring practitioner: Catarino Palomino MD  Date of Initial Visit: Type: THERAPY  Noted: 2022  Today's Date: 2022  Patient seen for 15 sessions       Visit Diagnoses:    ICD-10-CM ICD-9-CM   1. Acute pain of left shoulder  M25.512 719.41       Subjective   Saul Aguirre reports: Saw Ortho, continued PT recommended. Still on light work duty w/o change in lifting restriction. \"I'm feeling pretty good today.\"    Pre tx pn score: 2  Post tx pn score: 2      Objective   See Exercise, Manual, and Modality Logs for complete treatment.       Assessment & Plan     Assessment    Assessment details: Continued PT recommended at recent Ortho follow up. Pn remains minimal. Continued w/ focus on general shoulder strengthening/conditioning in order to prepare for anticipated RTW after next Ortho f/u. Pt reported slight inc shoulder discomfort w/ wall ball stab in plane of abduction, otherwise tolerated most activity w/ only fatigue. No inc in pn reported at end of visit. Pt needs continued PT to restore full strength, ROM, and function in order to allow return to full work and daily activities w/o pn or dysfunction.      Plan  Plan details: Strengthening, conditioning, functional activity          Timed:         Manual Therapy:    0     mins  55256;     Therapeutic Exercise:    10     mins  51551;     Neuromuscular Drew:    10    mins  83694;    Therapeutic Activity:     25     mins  41213;     Gait Trainin     mins  96646;     Ultrasound:     0     mins  25903;    Ionto                               0    mins   38578  Self Care                       0     mins   20269  Canalith Repos    0     mins 26798      Un-Timed:  Electrical Stimulation:    0     mins  04847 ( );  Dry Needling     0     mins self-pay  Traction     0     mins 26417      Timed Treatment:   45   mins   Total Treatment:     45   mins    Leila STEWART" Paul, PT  [place] License: #[id number]

## 2022-09-15 ENCOUNTER — TREATMENT (OUTPATIENT)
Dept: PHYSICAL THERAPY | Facility: CLINIC | Age: 27
End: 2022-09-15

## 2022-09-15 DIAGNOSIS — M25.512 ACUTE PAIN OF LEFT SHOULDER: Primary | ICD-10-CM

## 2022-09-15 PROCEDURE — 97112 NEUROMUSCULAR REEDUCATION: CPT | Performed by: PHYSICAL THERAPIST

## 2022-09-15 PROCEDURE — 97530 THERAPEUTIC ACTIVITIES: CPT | Performed by: PHYSICAL THERAPIST

## 2022-09-15 PROCEDURE — 97110 THERAPEUTIC EXERCISES: CPT | Performed by: PHYSICAL THERAPIST

## 2022-09-15 NOTE — PROGRESS NOTES
Physical Therapy Daily Treatment Note      Patient: Saul Aguirre   : 1995  Referring practitioner: Michael Wall MD  Date of Initial Visit: Type: THERAPY  Noted: 2022  Today's Date: 9/15/2022  Patient seen for 16 sessions       Visit Diagnoses:    ICD-10-CM ICD-9-CM   1. Acute pain of left shoulder  M25.512 719.41       Subjective   Saul Aguirre reports: Doing about the same. Still having slight shoulder discomfort at rest and w/ most activity. Pn ant and post shoulder. Doesn't recall having any pn after his previous visit.    Pre tx pn score: 2  Post tx pn score: 2      Objective   See Exercise, Manual, and Modality Logs for complete treatment.       Assessment & Plan     Assessment    Assessment details: Pt maintaining functional shoulder mobility. Continues to report mild ant/post shoulder pn at rest, does not reportedly inc w/ most daily activities or exercises. Has tolerated strengthening progressions well. Continued w/ general shoulder strengthening, stabilization, and conditioning as outlined in chart. He did well w/ all, no inc in pn reported at end of visit. Pt needs continued PT to restore full strength, ROM, and function in order to allow return to full work and daily activities w/o pn or dysfunction.      Plan  Plan details: Cont w/ functional strengthening-box lifts/carries          Timed:         Manual Therapy:    0     mins  41570;     Therapeutic Exercise:    10     mins  39996;     Neuromuscular Drew:    8    mins  48714;    Therapeutic Activity:     24     mins  62384;     Gait Trainin     mins  23555;     Ultrasound:     0     mins  76200;    Ionto                               0    mins   63817  Self Care                       0     mins   21642  Canalith Repos    0     mins 85169      Un-Timed:  Electrical Stimulation:    0     mins  19300 ( );  Dry Needling     0     mins self-pay  Traction     0     mins 10388      Timed Treatment:   42   mins   Total  Treatment:     42   mins    Leila Miller, PT  [place] License: #[id number]

## 2022-09-22 ENCOUNTER — TREATMENT (OUTPATIENT)
Dept: PHYSICAL THERAPY | Facility: CLINIC | Age: 27
End: 2022-09-22

## 2022-09-22 DIAGNOSIS — M25.512 ACUTE PAIN OF LEFT SHOULDER: Primary | ICD-10-CM

## 2022-09-22 PROCEDURE — 97112 NEUROMUSCULAR REEDUCATION: CPT | Performed by: PHYSICAL THERAPIST

## 2022-09-22 PROCEDURE — 97110 THERAPEUTIC EXERCISES: CPT | Performed by: PHYSICAL THERAPIST

## 2022-09-22 PROCEDURE — 97530 THERAPEUTIC ACTIVITIES: CPT | Performed by: PHYSICAL THERAPIST

## 2022-09-22 NOTE — PROGRESS NOTES
"Physical Therapy Daily Treatment Note      Patient: Saul Aguirre   : 1995  Referring practitioner: Michael Wall MD  Date of Initial Visit: Type: THERAPY  Noted: 2022  Today's Date: 2022  Patient seen for 17 sessions       Visit Diagnoses:    ICD-10-CM ICD-9-CM   1. Acute pain of left shoulder  M25.512 719.41       Subjective   Saul Aguirre reports: Soreness seems to be somewhat improved. Not feeling any pn, but shoulder still feels \"off\", not quite 100%.    Pre tx pn score: 0  Post tx pn score: 0      Objective   See Exercise, Manual, and Modality Logs for complete treatment.       Assessment & Plan     Assessment    Assessment details: Pn gradually improving. Continued today's visit w/ focus on general shoulder strengthening, conditioning, and work simulated activities as outlined in chart. Pt able to lift/carry/transfer 35lb box as well as 15lb DB to shoulder height repetitively, w/o pn, noting only muscle fatigue. No pn reported at end of visit. Pt needs continued PT to restore full strength, ROM, and function in order to allow return to full work and daily activities w/o pn or dysfunction.      Plan  Plan details: Continue w/ functional strengthening, work simulated activity          Timed:         Manual Therapy:    0     mins  06771;     Therapeutic Exercise:    15     mins  37231;     Neuromuscular Drew:    8    mins  67946;    Therapeutic Activity:     25     mins  25538;     Gait Trainin     mins  40156;     Ultrasound:     0     mins  61079;    Ionto                               0    mins   60660  Self Care                       0     mins   81620  Canalith Repos    0     mins 05481      Un-Timed:  Electrical Stimulation:    0     mins  61938 ( );  Dry Needling     0     mins self-pay  Traction     0     mins 87512      Timed Treatment:   48   mins   Total Treatment:     48   mins    Leila Miller, PT  [place] License: #[id number]                "

## 2022-09-26 ENCOUNTER — TELEPHONE (OUTPATIENT)
Dept: PHYSICAL THERAPY | Facility: CLINIC | Age: 27
End: 2022-09-26